# Patient Record
Sex: MALE | Race: WHITE | Employment: FULL TIME | URBAN - METROPOLITAN AREA
[De-identification: names, ages, dates, MRNs, and addresses within clinical notes are randomized per-mention and may not be internally consistent; named-entity substitution may affect disease eponyms.]

---

## 2019-09-18 ENCOUNTER — HOSPITAL ENCOUNTER (OUTPATIENT)
Dept: WOUND CARE | Facility: CLINIC | Age: 39
Discharge: HOME | End: 2019-09-18
Attending: SURGERY | Admitting: SURGERY
Payer: COMMERCIAL

## 2019-09-18 ENCOUNTER — MEDICAL CORRESPONDENCE (OUTPATIENT)
Dept: HEALTH INFORMATION MANAGEMENT | Facility: CLINIC | Age: 39
End: 2019-09-18

## 2019-09-18 VITALS
WEIGHT: 265.2 LBS | HEART RATE: 94 BPM | RESPIRATION RATE: 18 BRPM | SYSTOLIC BLOOD PRESSURE: 123 MMHG | DIASTOLIC BLOOD PRESSURE: 103 MMHG | BODY MASS INDEX: 31.45 KG/M2 | TEMPERATURE: 96.8 F

## 2019-09-18 DIAGNOSIS — I87.2 VENOUS STASIS DERMATITIS OF BOTH LOWER EXTREMITIES: Primary | ICD-10-CM

## 2019-09-18 PROBLEM — J30.9 ALLERGIC RHINITIS: Status: ACTIVE | Noted: 2017-04-20

## 2019-09-18 PROBLEM — F41.9 ANXIETY AND DEPRESSION: Status: ACTIVE | Noted: 2017-04-20

## 2019-09-18 PROBLEM — E78.5 HYPERLIPIDEMIA: Status: ACTIVE | Noted: 2017-05-22

## 2019-09-18 PROBLEM — J45.21 REACTIVE AIRWAY DISEASE, MILD INTERMITTENT, WITH ACUTE EXACERBATION: Status: ACTIVE | Noted: 2018-01-04

## 2019-09-18 PROBLEM — I83.023: Status: ACTIVE | Noted: 2019-08-29

## 2019-09-18 PROBLEM — E11.9 TYPE 2 DIABETES MELLITUS WITHOUT COMPLICATIONS (H): Status: ACTIVE | Noted: 2018-07-01

## 2019-09-18 PROBLEM — L97.322: Status: ACTIVE | Noted: 2019-08-29

## 2019-09-18 PROBLEM — F32.A ANXIETY AND DEPRESSION: Status: ACTIVE | Noted: 2017-04-20

## 2019-09-18 PROCEDURE — A6209 FOAM DRSG <=16 SQ IN W/O BDR: HCPCS

## 2019-09-18 PROCEDURE — 99202 OFFICE O/P NEW SF 15 MIN: CPT | Performed by: SURGERY

## 2019-09-18 PROCEDURE — 97602 WOUND(S) CARE NON-SELECTIVE: CPT

## 2019-09-18 PROCEDURE — G0463 HOSPITAL OUTPT CLINIC VISIT: HCPCS

## 2019-09-18 RX ORDER — EMOLLIENT COMBINATION NO.32
1 EMULSION, EXTENDED RELEASE TOPICAL 2 TIMES DAILY
Qty: 225 G | Refills: 11 | Status: SHIPPED | OUTPATIENT
Start: 2019-09-18

## 2019-09-18 RX ORDER — ALBUTEROL SULFATE 90 UG/1
1-2 AEROSOL, METERED RESPIRATORY (INHALATION)
COMMUNITY
Start: 2018-06-14

## 2019-09-18 RX ORDER — FLUTICASONE PROPIONATE 44 UG/1
AEROSOL, METERED RESPIRATORY (INHALATION)
COMMUNITY
Start: 2019-03-15

## 2019-09-18 RX ORDER — CITALOPRAM HYDROBROMIDE 40 MG/1
TABLET ORAL
COMMUNITY
Start: 2019-08-04

## 2019-09-18 NOTE — DISCHARGE INSTRUCTIONS
"      [facility] [place]  [address] [facility] [address], [place] [address]  Appointment Phone [phone] Nurse Advisors [phone]    Carlos Talley      1980  Please add in Vitamin D3 Vitamin 5,000 international units per day.    Reno Diosmin Hesperidin Cardiovascular Support Blood Health Vascular 1 tablet twice daily     Wound Dressing Change:Left Medial Ankle  Cleanse wound and surrounding skin with: soap and water, Prontosan Wound Irrigation Solution (order on amazon)  Apply Plurogel to hydrofera blue ready dressing cut to fit size of wound  Cover wound with bandage or secure with tape  Change dressing daily  Compression:   You have a compression socks is supposed to be removed at night and put back on first thing in the morning. Apply edemawear from toe to knee at night  Please remove compression dressing if toes turn blue and/or tingle and can not be relieved by raising the leg for one hour.        AZAM Mcdonald M.D.. September 18, 2019    Call us at [phone] if you have any questions about your wounds, have redness or swelling around your wound, have a fever of 101 or greater or if you have any other problems or concerns. We answer the phone Monday through Friday 8 am to 4 pm, please leave a message as we check the voicemail frequently throughout the day.     Follow up with Provider - 2 weeks     [phone] to place an order for 50 gram tube  Please order \"EdemaWear Open Toe\" from [employer] or Medium order         "

## 2019-09-18 NOTE — PROGRESS NOTES
[facility] [place] Progress Note    Subject: Carlos COLON Mayank consultation obtained for evaluation of left lower extremity venous hypertensive ulceration with associated lymphatic dysfunction, phlebolith edema, C6.  History of left lower extremity deep venous thromboses, utilizes standard of care compression stockings on a regular basis, anticipated venous procedure at [facility] October 25, 2019, left lesser saphenous vein ligation and bilateral varicosity avulsion Dr Smith note reviewed.Denies fevers chills or sweats, does have discomfort and pain with left lower extremity venous ulceration.    PMH: No past medical history on file.  There is no problem list on file for this patient.    Social Hx:   Social History     Socioeconomic History     Marital status:      Spouse name: Not on file     Number of children: Not on file     Years of education: Not on file     Highest education level: Not on file   Occupational History     Not on file   Social Needs     Financial resource strain: Not on file     Food insecurity:     Worry: Not on file     Inability: Not on file     Transportation needs:     Medical: Not on file     Non-medical: Not on file   Tobacco Use     Smoking status: Never Smoker     Smokeless tobacco: Never Used   Substance and Sexual Activity     Alcohol use: Yes     Drug use: No     Sexual activity: Yes     Partners: Female   Lifestyle     Physical activity:     Days per week: Not on file     Minutes per session: Not on file     Stress: Not on file   Relationships     Social connections:     Talks on phone: Not on file     Gets together: Not on file     Attends [employer] service: Not on file     Active member of club or organization: Not on file     Attends meetings of clubs or organizations: Not on file     Relationship status: Not on file     Intimate partner violence:     Fear of current or ex partner: Not on file     Emotionally abused: Not on file     Physically abused:  Not on file     Forced sexual activity: Not on file   Other Topics Concern     Not on file   Social History Narrative     Not on file       Surgical Hx: No past surgical history on file.    Allergies:    Allergies   Allergen Reactions     Hydrocodone-Acetaminophen Hives     Unsure if allergic to hydrocodone or not       Medications:   Current Outpatient Medications   Medication     albuterol (PROAIR HFA/PROVENTIL HFA/VENTOLIN HFA) 108 (90 Base) MCG/ACT inhaler     azithromycin (ZITHROMAX) 250 MG tablet     cetirizine (ZYRTEC) 10 MG tablet     citalopram (CELEXA) 40 MG tablet     fluticasone (FLOVENT HFA) 44 MCG/ACT inhaler     Montelukast Sodium (SINGULAIR PO)     predniSONE (DELTASONE) 20 MG tablet     No current facility-administered medications for this encounter.        Labs:   Recent Labs   Lab Test 08/21/13  1819   HGB 15.6   WBC 8.0     Nutrition requirements were discussed with patient today.  Objective:  BP (!) 123/103   Pulse 94   Temp 96.8  F (36  C) (Temporal)   Resp 18   Wt 120.3 kg (265 lb 3.2 oz)   BMI 31.45 kg/m    Wound (used by OP [facility] only) 09/18/19 1542 Left medial ankle (Active)   Length (cm) 1.4 9/18/2019  3:00 PM   Width (cm) 0.8 9/18/2019  3:00 PM   Depth (cm) 0.2 9/18/2019  3:00 PM   Wound (cm^2) 1.12 cm^2 9/18/2019  3:00 PM   Wound Volume (cm^3) 0.22 cm^3 9/18/2019  3:00 PM   Dressing Appearance moist drainage 9/18/2019  3:00 PM   Drainage Characteristics/Odor serosanguineous 9/18/2019  3:00 PM   Drainage Amount moderate 9/18/2019  3:00 PM        General:  Patient is alert and orientated, no acute distress.  Palpable left dorsalis pedis pulse, venous hypertensive ulceration left medial distal calf with extensive lipodermatosclerosis.  Motor and sensory grossly intact.  Wound has mild to moderate biofilm, no undermining, no bone or tendon exposure.  Mild venous stasis changes right lower examinee.      Vascular: Pedal pulses intact left.        Impression: C6, venous hypertensive  ulceration left lower extremity with lymphatic dysfunction, phlebolith edema  Barriers to healing include: Diabetes, proper offloading, smoking, nutrition. Patient education provided on each.   Plan:  We will dress the wounds with Plurogel applied to hydrofera blue, change daily, irrigate wound with Prontosan daily, standard compression stockings during the day, edema wear at night.  Vitamin D supplementation 5000 IU daily,diosmin 1 tab PO BID, Epiceram cream to stasis changes BID.  Patient will return to the clinic in 2 weeks time.     Omar Mcdonald MD on 9/18/2019 at 3:48 PM

## 2019-09-18 NOTE — PROGRESS NOTES
Patient arrived for wound care visit. Certified Wound Care Nurse time spent evaluating patient record, completed a full evaluation and documented wound(s) & francis-wound skin; provided recommendation based on treatment plan. Applied dressing, reviewed discharge instructions, patient education, and discussed plan of care with appropriate medical team staff members and patient and/or family members.

## 2019-09-29 ENCOUNTER — HEALTH MAINTENANCE LETTER (OUTPATIENT)
Age: 39
End: 2019-09-29

## 2019-10-02 ENCOUNTER — HOSPITAL ENCOUNTER (OUTPATIENT)
Dept: WOUND CARE | Facility: CLINIC | Age: 39
Discharge: HOME | End: 2019-10-02
Attending: SURGERY | Admitting: SURGERY
Payer: COMMERCIAL

## 2019-10-02 VITALS
DIASTOLIC BLOOD PRESSURE: 79 MMHG | TEMPERATURE: 96.6 F | SYSTOLIC BLOOD PRESSURE: 129 MMHG | HEART RATE: 101 BPM | RESPIRATION RATE: 18 BRPM

## 2019-10-02 DIAGNOSIS — I83.023 VARICOSE VEINS OF LEFT LOWER EXTREMITY WITH ULCER OF ANKLE WITH FAT LAYER EXPOSED (H): ICD-10-CM

## 2019-10-02 DIAGNOSIS — L97.322 VARICOSE VEINS OF LEFT LOWER EXTREMITY WITH ULCER OF ANKLE WITH FAT LAYER EXPOSED (H): ICD-10-CM

## 2019-10-02 PROCEDURE — 87077 CULTURE AEROBIC IDENTIFY: CPT | Performed by: SURGERY

## 2019-10-02 PROCEDURE — A6441 PAD BAND W>=3" <5"/YD: HCPCS

## 2019-10-02 PROCEDURE — 97602 WOUND(S) CARE NON-SELECTIVE: CPT

## 2019-10-02 PROCEDURE — 87075 CULTR BACTERIA EXCEPT BLOOD: CPT | Performed by: SURGERY

## 2019-10-02 PROCEDURE — 87070 CULTURE OTHR SPECIMN AEROBIC: CPT | Performed by: SURGERY

## 2019-10-02 PROCEDURE — A6454 SELF-ADHER BAND W>=3" <5"/YD: HCPCS

## 2019-10-02 PROCEDURE — 87147 CULTURE TYPE IMMUNOLOGIC: CPT | Performed by: SURGERY

## 2019-10-02 PROCEDURE — A6021 COLLAGEN DRESSING <=16 SQ IN: HCPCS

## 2019-10-02 PROCEDURE — 11042 DBRDMT SUBQ TIS 1ST 20SQCM/<: CPT

## 2019-10-02 PROCEDURE — 97597 DBRDMT OPN WND 1ST 20 CM/<: CPT | Performed by: SURGERY

## 2019-10-02 PROCEDURE — 87186 SC STD MICRODIL/AGAR DIL: CPT | Performed by: SURGERY

## 2019-10-02 RX ORDER — TRAMADOL HYDROCHLORIDE 50 MG/1
50 TABLET ORAL EVERY 6 HOURS PRN
Qty: 30 TABLET | Refills: 0 | Status: SHIPPED | OUTPATIENT
Start: 2019-10-02 | End: 2019-10-10

## 2019-10-02 RX ORDER — DOXYCYCLINE 100 MG/1
100 CAPSULE ORAL 2 TIMES DAILY
Qty: 20 CAPSULE | Refills: 0 | Status: SHIPPED | OUTPATIENT
Start: 2019-10-02 | End: 2019-10-12

## 2019-10-02 NOTE — DISCHARGE INSTRUCTIONS
[facility]  [address] [facility] [address], [place] [address]  Appointment Phone [phone] Nurse Advisors [phone]    Carlos Talley      1980  Keep leg dry with use of a cast protector (available at [employer] or [employer])  Wound Dressing Change: Left Medial Lower leg  Cleanse wound with prophase  Skin Care: Apply moisturizing cream to skin surrounding the wound (but not in the wound):critic aid   Cover wound with Endoform Antimicrobial 3 layers cut to fit the size of the wound   EdemaWear over wound  Followed by dina villasenor dressing  Change dressing weekly.   Compression:   Your compression wrap is a 2 layer coflex wrap and should stay on until next week.     Please remove compression dressing if toes turn blue and/or tingle and can not be relieved by raising the leg for one hour.     Please raise your legs above your heart for 30 mins 3 times a day to promote wound healing.     AZAM Mcdonald M.D.. October 2, 2019  Call us at [phone] if you have any questions about your wounds, have redness or swelling around your wound, have a fever of 101 or greater or if you have any other problems or concerns. We answer the phone Monday through Friday 8 am to 4 pm, please leave a message as we check the voicemail frequently throughout the day.   Follow up with Provider - Monday or tuesday

## 2019-10-02 NOTE — PROGRESS NOTES
[facility] Wound [facility]aling [place] Progress Note    Subject: Carlos Talley chronic left lower extremity venous hypertensive ulceration, recurrent, pending surgical procedure for closure of left small saphenous vein October 29, [facility].  Periwound maceration, discomfort, denies fever, pain or sweats.  Prediabetic, on no medications, watch his diet closely, does not take simple sugars.    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  Exam:  There were no vitals taken for this visit.     General appearance is nondistressed, conversant, alert and oriented x3.  Palpable left dorsalis pedis pulse.  Periwound maceration.  Wound has biofilm present which was removed.  Irrigated with phase.  Herman culture obtained.  Hemosiderin deposition.    Procedure:   Patient was determined to be capable of making their own medical decisions and informed consent was obtained. Topical anesthetic of 4% lidocaine was applied, debridement was performed using a #15 blade down to and including subcutaneous tissue biofilm, selective, bleeding controlled with light pressure. Patient tolerated procedure well.    Impression: Left lower extremity venous hypertensive ulceration, C6,    Plan: We will dress the wounds with 4 layer endoform, zinc oxide periwound margin, EdemaWear, Kerra Max, 2 layer wrap.  Continue Ronny and vitamin D supplementation, Diosmin Flavanoid..  Patient will return to the clinic Monday, October 7 for dressing change and react wound reevaluation.  Initiate doxycycline 100 mg twice daily for 7 days given degree of discomfort and cellulitis, culture obtained, Neom.    Omar Mcdonald MD on 10/2/2019 at  3:10 PM  No images are attached to the encounter.

## 2019-10-07 ENCOUNTER — HOSPITAL ENCOUNTER (OUTPATIENT)
Dept: WOUND CARE | Facility: CLINIC | Age: 39
Discharge: HOME | End: 2019-10-07
Attending: SURGERY | Admitting: SURGERY
Payer: COMMERCIAL

## 2019-10-07 VITALS
SYSTOLIC BLOOD PRESSURE: 124 MMHG | HEART RATE: 95 BPM | TEMPERATURE: 96.8 F | RESPIRATION RATE: 18 BRPM | DIASTOLIC BLOOD PRESSURE: 89 MMHG

## 2019-10-07 DIAGNOSIS — I83.023 VARICOSE VEINS OF LEFT LOWER EXTREMITY WITH ULCER OF ANKLE WITH FAT LAYER EXPOSED (H): ICD-10-CM

## 2019-10-07 DIAGNOSIS — L97.322 VARICOSE VEINS OF LEFT LOWER EXTREMITY WITH ULCER OF ANKLE WITH FAT LAYER EXPOSED (H): ICD-10-CM

## 2019-10-07 LAB
BACTERIA SPEC CULT: ABNORMAL
Lab: ABNORMAL
SPECIMEN SOURCE: ABNORMAL

## 2019-10-07 PROCEDURE — A6454 SELF-ADHER BAND W>=3" <5"/YD: HCPCS

## 2019-10-07 PROCEDURE — A6441 PAD BAND W>=3" <5"/YD: HCPCS

## 2019-10-07 PROCEDURE — 11042 DBRDMT SUBQ TIS 1ST 20SQCM/<: CPT | Performed by: SURGERY

## 2019-10-07 PROCEDURE — 99213 OFFICE O/P EST LOW 20 MIN: CPT | Mod: 25 | Performed by: SURGERY

## 2019-10-07 PROCEDURE — 88305 TISSUE EXAM BY PATHOLOGIST: CPT | Mod: TC | Performed by: SURGERY

## 2019-10-07 PROCEDURE — 11042 DBRDMT SUBQ TIS 1ST 20SQCM/<: CPT

## 2019-10-07 RX ORDER — GABAPENTIN 100 MG/1
100 CAPSULE ORAL 3 TIMES DAILY
Qty: 60 CAPSULE | Refills: 0 | Status: SHIPPED | OUTPATIENT
Start: 2019-10-07

## 2019-10-07 NOTE — ADDENDUM NOTE
Encounter addended by: Sarai Arnold RN on: 10/7/2019 1:32 PM   Actions taken: Order list changed, Multistep and multistep collection tasks completed, Visit Navigator Flowsheet section accepted, Charge Capture section accepted, Flowsheet accepted

## 2019-10-07 NOTE — DISCHARGE INSTRUCTIONS
[facility]  [address] [facility] [address], [place] [address]  Appointment Phone [phone] Nurse Advisors [phone]    Carlos Talley      1980  Keep leg dry with use of a cast protector (available at [employer] or [employer])  Wound Dressing Change: Left Medial Lower leg  Cleanse wound with prophase  Skin Care: Apply moisturizing cream to skin surrounding the wound (but not in the  wound):critic aid  Cover wound with Plurogel, Zorflex  EdemaWear over wound  Followed by dina villasenor dressing  Change dressing weekly.  Compression:  Your compression wrap is a 2 layer coflex wrap and should stay on until next week.  Please remove compression dressing if toes turn blue and/or tingle and can not be  relieved by raising the leg for one hour.  Please raise your legs above your heart for 30 mins 3 times a day to promote wound  healing.       AZAM Mcdonald M.D.. October 7, 2019    Call us at [phone] if you have any questions about your wounds, have redness or swelling around your wound, have a fever of 101 or greater or if you have any other problems or concerns. We answer the phone Monday through Friday 8 am to 4 pm, please leave a message as we check the voicemail frequently throughout the day.     Follow up with Provider - Thursday and Monday or Tuesday with Tamara

## 2019-10-07 NOTE — PROGRESS NOTES
[facility]aling [place] Progress Note    Subject: Carlos Talley returns for evaluation of left lower extremity venous hypertensive ulceration, C6, planned left small saphenous vein ablation through [facility] end of October.  Still having moderate to significant pain, difficulty sleeping.  Has been wrapped in a compression wrap, 2 layer.  Does not utilize tobacco.    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  Exam:  /89   Pulse 95   Temp 96.8  F (36  C)   Resp 18   Wound (used by OP [facility] only) 09/18/19 1542 Left medial ankle (Active)   Length (cm) 1.7 10/7/2019 12:34 PM   Width (cm) 2 10/7/2019 12:34 PM   Depth (cm) 0.3 10/7/2019 12:34 PM   Wound (cm^2) 3.4 cm^2 10/7/2019 12:34 PM   Wound Volume (cm^3) 1.02 cm^3 10/7/2019 12:34 PM   Wound healing % -203.57 10/7/2019 12:34 PM   Dressing Appearance moist drainage 10/7/2019 12:34 PM   Drainage Characteristics/Odor creamy;serosanguineous 10/7/2019 12:34 PM   Drainage Amount moderate 10/7/2019 12:34 PM     General appearance is nondistressed, conversant, alert and oriented x3.  Palpable left dorsalis pedis pulse.  Lidocaine infiltrated, two 5 mm punch biopsies obtained of periwound margins.  Hemostasis achieved with initially with pressure, silver nitrate, absorbable stitch placed to obtain complete hemostasis.  Plurogel and carbon cloth applied over wound bed, EdemaWear and 2 layer wrap.    Procedure:   Patient was determined to be capable of making their own medical decisions and informed consent was obtained. Topical anesthetic of 4% lidocaine was applied, debridement was performed using a #15 blade down to and including  subcutaneous tissue biofilm bleeding controlled with light pressure, application of stitch. Patient tolerated procedure well.    Impression: Left lower extremity ulceration, biopsy completed today, most consistent with venous hypertensive ulceration with lymphatic dysfunction.    Plan: We will dress the wounds with Plurogel, carbon cloth, 2 layer wrap.  Patient will return to the clinic in 3-4 days with PA, than MD 1 week time    Omar Mcdonald MD on 10/7/2019 at 1:24 PM

## 2019-10-08 ENCOUNTER — HOSPITAL ENCOUNTER (OUTPATIENT)
Dept: WOUND CARE | Facility: CLINIC | Age: 39
Discharge: HOME | End: 2019-10-08
Attending: SURGERY | Admitting: SURGERY
Payer: COMMERCIAL

## 2019-10-08 VITALS
DIASTOLIC BLOOD PRESSURE: 99 MMHG | SYSTOLIC BLOOD PRESSURE: 164 MMHG | HEART RATE: 108 BPM | TEMPERATURE: 96.8 F | RESPIRATION RATE: 19 BRPM

## 2019-10-08 DIAGNOSIS — I83.023 VARICOSE VEINS OF LEFT LOWER EXTREMITY WITH ULCER OF ANKLE WITH FAT LAYER EXPOSED (H): ICD-10-CM

## 2019-10-08 DIAGNOSIS — L97.322 VARICOSE VEINS OF LEFT LOWER EXTREMITY WITH ULCER OF ANKLE WITH FAT LAYER EXPOSED (H): ICD-10-CM

## 2019-10-08 LAB — COPATH REPORT: NORMAL

## 2019-10-08 PROCEDURE — A6454 SELF-ADHER BAND W>=3" <5"/YD: HCPCS

## 2019-10-08 PROCEDURE — 11042 DBRDMT SUBQ TIS 1ST 20SQCM/<: CPT | Performed by: SURGERY

## 2019-10-08 PROCEDURE — 99212 OFFICE O/P EST SF 10 MIN: CPT | Mod: 25 | Performed by: SURGERY

## 2019-10-08 PROCEDURE — A6441 PAD BAND W>=3" <5"/YD: HCPCS

## 2019-10-08 PROCEDURE — 97602 WOUND(S) CARE NON-SELECTIVE: CPT

## 2019-10-08 PROCEDURE — 11042 DBRDMT SUBQ TIS 1ST 20SQCM/<: CPT

## 2019-10-08 NOTE — DISCHARGE INSTRUCTIONS
[facility]  [address] [facility] [address], [place] [address]  Appointment Phone [phone] Nurse Advisors [phone]    Carlos Talley      1980    Wound Dressing Change: Left Medial Lower leg  Cleanse wound with prophase  Cover wound with Zorflex  EdemaWear over wound  Followed by kerra max dressing  Change dressing weekly.  Compression:  Your compression wrap is a 2 layer coflex wrap and should stay on until next week.  Please remove compression dressing if toes turn blue and/or tingle and can not be  relieved by raising the leg for one hour.  Please raise your legs above your heart for 30 mins 3 times a day to promote wound  healing.       AZAM Mcdonald M.D.. October 8, 2019    Call us at [phone] if you have any questions about your wounds, have redness or swelling around your wound, have a fever of 101 or greater or if you have any other problems or concerns. We answer the phone Monday through Friday 8 am to 4 pm, please leave a message as we check the voicemail frequently throughout the day.     Follow up with Provider - Monday

## 2019-10-08 NOTE — PROGRESS NOTES
[facility] Wound Healing [place] Progress Note    Subject: Carlos Talley biopsies obtained yesterday, dressing placed, saturated dressing, returns for evaluation.  Pain control good last night, Neurontin was initiated 100 mg 3 times daily.  No other concerns.    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  Exam:  BP (!) 164/99   Pulse 108   Temp 96.8  F (36  C) (Temporal)   Resp 19   Wound (used by OP [facility] only) 09/18/19 1542 Left medial ankle (Active)   Length (cm) 2.3 10/8/2019  9:00 AM   Width (cm) 2.6 10/8/2019  9:00 AM   Depth (cm) 0.2 10/8/2019  9:00 AM   Wound (cm^2) 5.98 cm^2 10/8/2019  9:00 AM   Wound Volume (cm^3) 1.2 cm^3 10/8/2019  9:00 AM   Wound healing % -433.93 10/8/2019  9:00 AM   Dressing Appearance moist drainage 10/8/2019  9:00 AM   Drainage Characteristics/Odor sanguineous 10/8/2019  9:00 AM   Drainage Amount large 10/8/2019  9:00 AM   Thickness/Stage full thickness 10/8/2019  9:00 AM   Base red/granulating 10/8/2019  9:00 AM   Periwound intact 10/8/2019  9:00 AM   Periwound Temperature warm 10/8/2019  9:00 AM   Periwound Skin Turgor soft 10/8/2019  9:00 AM   Edges open 10/8/2019  9:00 AM   Care, Wound non-select wound debridement performed 10/8/2019  9:00 AM     Dressing removed left lower extremity, coagulum removed, biopsy sites without bleeding, periwound maceration is mild.  Left pedal pulses intact.    Procedure:   Patient was determined to be capable of making their own medical decisions and informed consent was obtained. Topical anesthetic of 4% lidocaine was applied, debridement was performed using a #15 blade down to and including subcutaneous tissue biofilm bleeding  controlled with light pressure. Patient tolerated procedure well.    Impression: Left lower extremity venous hypertensive ulceration, lymphatic dysfunction, Phlebolymphedema.  Venous insufficiency, pending procedure at [facility] late October.  Place carbon cloth, EdemaWear, compression      Patient will return to the clinic in 1 weeks time, reviewed biopsy when completed.    Omar Mcdonald MD on 10/8/2019 at 11:18 AM

## 2019-10-09 LAB
BACTERIA SPEC CULT: ABNORMAL
BACTERIA SPEC CULT: ABNORMAL
Lab: ABNORMAL
SPECIMEN SOURCE: ABNORMAL

## 2019-10-14 ENCOUNTER — HOSPITAL ENCOUNTER (OUTPATIENT)
Dept: WOUND CARE | Facility: CLINIC | Age: 39
Discharge: HOME | End: 2019-10-14
Attending: SURGERY | Admitting: SURGERY
Payer: COMMERCIAL

## 2019-10-14 VITALS
DIASTOLIC BLOOD PRESSURE: 79 MMHG | TEMPERATURE: 96.6 F | RESPIRATION RATE: 18 BRPM | HEART RATE: 97 BPM | SYSTOLIC BLOOD PRESSURE: 125 MMHG

## 2019-10-14 DIAGNOSIS — L97.322 VARICOSE VEINS OF LEFT LOWER EXTREMITY WITH ULCER OF ANKLE WITH FAT LAYER EXPOSED (H): ICD-10-CM

## 2019-10-14 DIAGNOSIS — I83.023 VARICOSE VEINS OF LEFT LOWER EXTREMITY WITH ULCER OF ANKLE WITH FAT LAYER EXPOSED (H): ICD-10-CM

## 2019-10-14 PROCEDURE — A6209 FOAM DRSG <=16 SQ IN W/O BDR: HCPCS

## 2019-10-14 PROCEDURE — 97602 WOUND(S) CARE NON-SELECTIVE: CPT

## 2019-10-14 PROCEDURE — 99212 OFFICE O/P EST SF 10 MIN: CPT | Performed by: SURGERY

## 2019-10-14 NOTE — PROGRESS NOTES
[facility] Wound Healing [place] Progress Note    Subject: Carlos Talley returns for evaluation of left lower extremity venous hypertensive ulceration pending surgical procedure at [facility] later this month.  Pain improved, on Neurontin 100 mg 3 times daily, less edema.  Has Plurogel and EdemaWear for use.  Denies fevers chills or sweats.  Worked today at [employer].  Has cut out the side of his shoe to prevent shear forces along the wound area.    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  Exam:  /79   Pulse 97   Temp 96.6  F (35.9  C) (Temporal)   Resp 18   Wound (used by OP [facility] only) 09/18/19 1542 Left medial ankle ulceration, venous (Active)   Length (cm) 2.5 10/14/2019  3:00 PM   Width (cm) 2.8 10/14/2019  3:00 PM   Depth (cm) 0.4 10/14/2019  3:00 PM   Wound (cm^2) 7 cm^2 10/14/2019  3:00 PM   Wound Volume (cm^3) 2.8 cm^3 10/14/2019  3:00 PM   Wound healing % -525 10/14/2019  3:00 PM   Dressing Appearance moist drainage 10/14/2019  3:00 PM   Drainage Characteristics/Odor serosanguineous 10/14/2019  3:00 PM   Drainage Amount moderate 10/14/2019  3:00 PM   Thickness/Stage full thickness 10/14/2019  3:32 PM   Base red/granulating;slough 10/14/2019  3:32 PM   Periwound intact;edematous 10/14/2019  3:32 PM   Periwound Temperature warm 10/14/2019  3:32 PM   Care, Wound non-select wound debridement performed 10/14/2019  3:32 PM     Nondistressed, conversant, alert and oriented x3.  Less inflammatory changes left medial inframalleolar venous hypertensive ulceration.  Palpable left pedal pulse.  Too sensitive for debridement of biofilm.      Impression: Left lower extremity venous  hypertensive ulceration with palpable pulse    Plan: We will dress the wounds with Plurogel on Hydrofera Blue, zinc oxide to periwound margin, EdemaWear, change daily.  Diosmin Flavanoid 1 tablet daily.  Vitamin D 5000 units daily.  Patient will return to the clinic in 1 weeks time    Omar Mcdonald MD on 10/14/2019 at 3:38 PM

## 2020-03-15 ENCOUNTER — HEALTH MAINTENANCE LETTER (OUTPATIENT)
Age: 40
End: 2020-03-15

## 2021-01-15 ENCOUNTER — HEALTH MAINTENANCE LETTER (OUTPATIENT)
Age: 41
End: 2021-01-15

## 2021-05-09 ENCOUNTER — HEALTH MAINTENANCE LETTER (OUTPATIENT)
Age: 41
End: 2021-05-09

## 2021-08-29 ENCOUNTER — HEALTH MAINTENANCE LETTER (OUTPATIENT)
Age: 41
End: 2021-08-29

## 2021-10-24 ENCOUNTER — HEALTH MAINTENANCE LETTER (OUTPATIENT)
Age: 41
End: 2021-10-24

## 2021-12-19 ENCOUNTER — HEALTH MAINTENANCE LETTER (OUTPATIENT)
Age: 41
End: 2021-12-19

## 2022-02-13 ENCOUNTER — HEALTH MAINTENANCE LETTER (OUTPATIENT)
Age: 42
End: 2022-02-13

## 2022-04-10 ENCOUNTER — HEALTH MAINTENANCE LETTER (OUTPATIENT)
Age: 42
End: 2022-04-10

## 2022-07-31 ENCOUNTER — HEALTH MAINTENANCE LETTER (OUTPATIENT)
Age: 42
End: 2022-07-31

## 2022-10-15 ENCOUNTER — HEALTH MAINTENANCE LETTER (OUTPATIENT)
Age: 42
End: 2022-10-15

## 2022-11-27 ENCOUNTER — HEALTH MAINTENANCE LETTER (OUTPATIENT)
Age: 42
End: 2022-11-27

## 2023-03-26 ENCOUNTER — HEALTH MAINTENANCE LETTER (OUTPATIENT)
Age: 43
End: 2023-03-26

## 2023-07-26 NOTE — DISCHARGE INSTRUCTIONS
[facility]  [address] [facility] [address], [facility] [address]  Appointment Phone [phone] Nurse Advisors [phone]    Carlos Talley      1980    Wound Dressing Change to left medial ankle  Cleanse wound with wound cleanser  Skin Care: Apply moisturizing cream to skin surrounding the wound (but not in the wound) [employer]  Cover wound with Plurogel on Hydrofera Blue Transfer followed by EdemaWear  Change dressing daily     AZAM Mcdonald M.D.. October 14, 2019    Call us at [phone] if you have any questions about your wounds, have redness or swelling around your wound, have a fever of 101 or greater or if you have any other problems or concerns. We answer the phone Monday through Friday 8 am to 4 pm, please leave a message as we check the voicemail frequently throughout the day.     Follow up with Dr. Mcdonald next Wednesday           
Home

## 2023-08-20 ENCOUNTER — HEALTH MAINTENANCE LETTER (OUTPATIENT)
Age: 43
End: 2023-08-20

## 2024-01-07 ENCOUNTER — HEALTH MAINTENANCE LETTER (OUTPATIENT)
Age: 44
End: 2024-01-07